# Patient Record
(demographics unavailable — no encounter records)

---

## 2024-12-17 NOTE — IMAGING
[de-identified] : The patient is a well appearing 51 year  old male of their stated age. Patient ambulates with a normal gait. Negative straight leg raise bilateral.   Effected Knee: LEFT  ROM:  3-140 degrees Lachman: Negative Pivot Shift: Negative Anterior Drawer: Negative Posterior Drawer / Sag: POSITIVE  Varus Stress 0 degrees: Stable Varus Stress 30 degrees: Stable Valgus Stress 0 degrees: Stable Valgus Stress 30 degrees: Stable Medial Jerry: POSITIVE  Lateral Jerry: POSITIVE  Patella Glide: 2+ Patella Apprehension: Negative Patella Grind: Negative Pes [place] Valgus: Negative Pes Cavus: Negative   Palpation: Medial Joint Line: TENDER  Lateral Joint Line: TENDER  Medial Collateral Ligament: Nontender Lateral Collateral Ligament/PLC: Nontender Distal Femur: Nontender Proximal Tibia: Nontender Tibial Tubercle: Nontender Medial Femoral Condyle: TENDER  Gerdy's Tubercle: Nontender Distal Pole Patella: Nontender Quadriceps Tendon: Nontender &  Intact Patella Tendon: Nontender &  Intact Medial Femoral Condyle: TENDER  Medial Distal Hamstring/PES: Nontender Lateral Distal Hamstring: Nontender & Stable Iliotibial Band: Nontender Medial Patellofemoral Ligament: Nontender Adductor: Nontender Proximal GSC-Plantaris: Nontender Calf: Supple & Nontender   Inspection: Deformity: No Erythema: No Ecchymosis: No Abrasions: No Effusion: No Prepatellar Bursitis: No Neurologic Exam: Sensation L4-S1: Grossly Intact Motor Exam: Quadriceps: 5 out of 5 Hamstrings: 5 out of 5 EHL: 5 out of 5 FHL: 5 out of 5 TA: 5 out of 5 GS: 5 out of 5 Circulatory/Pulses: Dorsalis Pedis: 2+ Posterior Tibialis: 2+ Additional Pertinent Findings: None   Contralateral Knee:                           ROM: 0-145 degrees Other Pertinent Findings: None   Assessment: The patient is a 51 year old male with left knee pain and radiographic and physical exam findings consistent with MMT, LMT, chronic PCL tear and osteoarthritis. The patient's condition is chronic. Documents/Results Reviewed Today: MRI left knee  Tests/Studies Independently Interpreted Today: MRI left knee reveals evidence of complex medial meniscus tearing with unstable flap, moderate medial compartment degenerative changes with subchondral edema, lateral meniscal tearing, chronic appearing posterior cruciate ligament tearing.  Pertinent findings include: 3-140, MJLT, LJLT, tender medial femoral condyle, tender medial tibial plateau, +medial jerry, +lateral jerry, +posterior drawer with sag sign.  Confounding medical conditions/concerns: Treated with outside physicians, 2c CSI without improvement last 2x weeks ago. Has been taking Mobic & Diclofenac.    Plan: We discussed treatment options both operative vs non-operative for the patients meniscal tearing, arthritis, and posterior knee instability. Although the patient may have relief of mechanical symptoms from a knee arthroscopy, he ultimately will have to consider a total knee arthroplasty. Upon clinical examination and report of symptoms, his pain appears to be more arthritic related. The patient decides to defer any arthroscopy and focus on a non-operative treatment of arthritic related pain. Patient will start physical therapy, HEP, and stretching. Advised patient to obtain Reparel sleeve. Continue use of outside prescribed Meloxicam 15mg or Diclofenac for pain management and inflammation - use as directed and take with food. If his knee pain is worsening, we will discuss proceeding with Visco supplementation injections before consider arthroscopy. Modify activity as discussed.  Tests Ordered: None  Prescription Medications Ordered: Continue PRN use of outside prescribed Meloxicam 15mg and Diclofenac  Braces/DME Ordered: Reparel knee sleeve  Activity/Work/Sports Status: As tolerated  Additional Instructions: None Follow-Up: 6 weeks   The patient's current medication management of their orthopedic diagnosis was reviewed today: (1) The patient will continue with the PRN use of their prescribed anti-inflammatory. (2) There is a moderate risk of morbidity with further treatment, especially from use of prescription strength medications and possible side effects of these medications which include upset stomach with oral medications, skin reactions to topical medications and cardiac/renal issues with long term use. (3) I recommended that the patient follow-up with their medical physician to discuss any significant specific potential issues with long term medication use such as interactions with current medications or with exacerbation of underlying medical comorbidities. (4) The benefits and risks associated with use of injectable, oral or topical, prescription and over the counter anti-inflammatory medications were discussed with the patient. The patient voiced understanding of the risks including but not limited to bleeding, stroke, kidney dysfunction, heart disease, and were referred to the black box warning label for further information.   I, Tressa Ulrich attest that this documentation has been prepared under the direction and in the presence of Provider Dr. Wilfrid M. Paci.    The documentation recorded by the scribe accurately reflects the services I, Dr. Wilfrid Dixon, personally performed and the decisions made by me.

## 2024-12-17 NOTE — IMAGING
[de-identified] : The patient is a well appearing 51 year  old male of their stated age. Patient ambulates with a normal gait. Negative straight leg raise bilateral.   Effected Knee: LEFT  ROM:  3-140 degrees Lachman: Negative Pivot Shift: Negative Anterior Drawer: Negative Posterior Drawer / Sag: POSITIVE  Varus Stress 0 degrees: Stable Varus Stress 30 degrees: Stable Valgus Stress 0 degrees: Stable Valgus Stress 30 degrees: Stable Medial Jerry: POSITIVE  Lateral Jerry: POSITIVE  Patella Glide: 2+ Patella Apprehension: Negative Patella Grind: Negative Pes [place] Valgus: Negative Pes Cavus: Negative   Palpation: Medial Joint Line: TENDER  Lateral Joint Line: TENDER  Medial Collateral Ligament: Nontender Lateral Collateral Ligament/PLC: Nontender Distal Femur: Nontender Proximal Tibia: Nontender Tibial Tubercle: Nontender Medial Femoral Condyle: TENDER  Gerdy's Tubercle: Nontender Distal Pole Patella: Nontender Quadriceps Tendon: Nontender &  Intact Patella Tendon: Nontender &  Intact Medial Femoral Condyle: TENDER  Medial Distal Hamstring/PES: Nontender Lateral Distal Hamstring: Nontender & Stable Iliotibial Band: Nontender Medial Patellofemoral Ligament: Nontender Adductor: Nontender Proximal GSC-Plantaris: Nontender Calf: Supple & Nontender   Inspection: Deformity: No Erythema: No Ecchymosis: No Abrasions: No Effusion: No Prepatellar Bursitis: No Neurologic Exam: Sensation L4-S1: Grossly Intact Motor Exam: Quadriceps: 5 out of 5 Hamstrings: 5 out of 5 EHL: 5 out of 5 FHL: 5 out of 5 TA: 5 out of 5 GS: 5 out of 5 Circulatory/Pulses: Dorsalis Pedis: 2+ Posterior Tibialis: 2+ Additional Pertinent Findings: None   Contralateral Knee:                           ROM: 0-145 degrees Other Pertinent Findings: None   Assessment: The patient is a 51 year old male with left knee pain and radiographic and physical exam findings consistent with MMT, LMT, chronic PCL tear and osteoarthritis. The patient's condition is chronic. Documents/Results Reviewed Today: MRI left knee  Tests/Studies Independently Interpreted Today: MRI left knee reveals evidence of complex medial meniscus tearing with unstable flap, moderate medial compartment degenerative changes with subchondral edema, lateral meniscal tearing, chronic appearing posterior cruciate ligament tearing.  Pertinent findings include: 3-140, MJLT, LJLT, tender medial femoral condyle, tender medial tibial plateau, +medial jerry, +lateral jerry, +posterior drawer with sag sign.  Confounding medical conditions/concerns: Treated with outside physicians, 2c CSI without improvement last 2x weeks ago. Has been taking Mobic & Diclofenac.    Plan: We discussed treatment options both operative vs non-operative for the patients meniscal tearing, arthritis, and posterior knee instability. Although the patient may have relief of mechanical symptoms from a knee arthroscopy, he ultimately will have to consider a total knee arthroplasty. Upon clinical examination and report of symptoms, his pain appears to be more arthritic related. The patient decides to defer any arthroscopy and focus on a non-operative treatment of arthritic related pain. Patient will start physical therapy, HEP, and stretching. Advised patient to obtain Reparel sleeve. Continue use of outside prescribed Meloxicam 15mg or Diclofenac for pain management and inflammation - use as directed and take with food. If his knee pain is worsening, we will discuss proceeding with Visco supplementation injections before consider arthroscopy. Modify activity as discussed.  Tests Ordered: None  Prescription Medications Ordered: Continue PRN use of outside prescribed Meloxicam 15mg and Diclofenac  Braces/DME Ordered: Reparel knee sleeve  Activity/Work/Sports Status: As tolerated  Additional Instructions: None Follow-Up: 6 weeks   The patient's current medication management of their orthopedic diagnosis was reviewed today: (1) The patient will continue with the PRN use of their prescribed anti-inflammatory. (2) There is a moderate risk of morbidity with further treatment, especially from use of prescription strength medications and possible side effects of these medications which include upset stomach with oral medications, skin reactions to topical medications and cardiac/renal issues with long term use. (3) I recommended that the patient follow-up with their medical physician to discuss any significant specific potential issues with long term medication use such as interactions with current medications or with exacerbation of underlying medical comorbidities. (4) The benefits and risks associated with use of injectable, oral or topical, prescription and over the counter anti-inflammatory medications were discussed with the patient. The patient voiced understanding of the risks including but not limited to bleeding, stroke, kidney dysfunction, heart disease, and were referred to the black box warning label for further information.   I, Tressa Ulrich attest that this documentation has been prepared under the direction and in the presence of Provider Dr. Wilfrid M. Paci.    The documentation recorded by the scribe accurately reflects the services I, Dr. Wilfrid Dixon, personally performed and the decisions made by me.

## 2024-12-17 NOTE — HISTORY OF PRESENT ILLNESS
[de-identified] : The patient is a 51 year  old R hand dominant male who presents today complaining of L knee pain.   Date of Injury/Onset: 6/1/24 Pain:    At Rest: 5/10  With Activity:  9/10  Mechanism of injury: insidious onset This is NOT a Work Related Injury being treated under Worker's Compensation. This is NOT an athletic injury occurring associated with an interscholastic or organized sports team. Quality of symptoms: sharp, aching anterior knee pain Improves with: rest, diclofenac  Worse with: prolonged walking/standing Prior treatment: Dr. Jamari Larios in July - ASP & CSI & Dr. Larios on 12/12 - CSI,- prescribed Mobic & Diclofenac  Prior Imaging: Xray, MRI at   Out of work/sport: currently working School/Sport/Position/Occupation:  - gym, soccer Additional Information: None

## 2024-12-17 NOTE — DATA REVIEWED
[MRI] : MRI [Left] : left [Knee] : knee [Report was reviewed and noted in the chart] : The report was reviewed and noted in the chart [I independently reviewed and interpreted images and report] : I independently reviewed and interpreted images and report [I reviewed the films/CD and additionally noted] : I reviewed the films/CD and additionally noted [FreeTextEntry1] : MRI left knee reveals evidence of complex medial meniscus tearing with unstable flap, moderate medial compartment degenerative changes with subchondral edema, lateral meniscal tearing, chronic appearing posterior cruciate ligament tearing.

## 2024-12-17 NOTE — HISTORY OF PRESENT ILLNESS
[de-identified] : The patient is a 51 year  old R hand dominant male who presents today complaining of L knee pain.   Date of Injury/Onset: 6/1/24 Pain:    At Rest: 5/10  With Activity:  9/10  Mechanism of injury: insidious onset This is NOT a Work Related Injury being treated under Worker's Compensation. This is NOT an athletic injury occurring associated with an interscholastic or organized sports team. Quality of symptoms: sharp, aching anterior knee pain Improves with: rest, diclofenac  Worse with: prolonged walking/standing Prior treatment: Dr. Jamari Larios in July - ASP & CSI & Dr. Larios on 12/12 - CSI,- prescribed Mobic & Diclofenac  Prior Imaging: Xray, MRI at   Out of work/sport: currently working School/Sport/Position/Occupation:  - gym, soccer Additional Information: None

## 2025-02-04 NOTE — HISTORY OF PRESENT ILLNESS
[de-identified] : The patient is a 51 year  old R hand dominant male who presents today complaining of L knee pain.   Date of Injury/Onset: 6/1/24 Pain:    At Rest: 7/10  With Activity:  7/10  Mechanism of injury: insidious onset This is NOT a Work Related Injury being treated under Worker's Compensation. This is NOT an athletic injury occurring associated with an interscholastic or organized sports team. Quality of symptoms: sharp, aching anterior knee pain Improves with: rest, diclofenac  Worse with: prolonged walking/standing, sit to stand  Treatment/Imaging/Studies Since Last Visit: [facility], HEP 	Reports Available For Review Today: none Out of work/sport: currently working School/Sport/Position/Occupation:  - gym, soccer Changes since last visit: Feeling a little better. In PT and doing HEP Additional Information: None

## 2025-02-04 NOTE — HISTORY OF PRESENT ILLNESS
[de-identified] : The patient is a 51 year  old R hand dominant male who presents today complaining of L knee pain.   Date of Injury/Onset: 6/1/24 Pain:    At Rest: 7/10  With Activity:  7/10  Mechanism of injury: insidious onset This is NOT a Work Related Injury being treated under Worker's Compensation. This is NOT an athletic injury occurring associated with an interscholastic or organized sports team. Quality of symptoms: sharp, aching anterior knee pain Improves with: rest, diclofenac  Worse with: prolonged walking/standing, sit to stand  Treatment/Imaging/Studies Since Last Visit: [facility] [place], HEP 	Reports Available For Review Today: none Out of work/sport: currently working School/Sport/Position/Occupation:  - gym, soccer Changes since last visit: Feeling a little better. In PT and doing HEP Additional Information: None

## 2025-02-04 NOTE — IMAGING
[de-identified] : The patient is a well appearing 51 year  old male of their stated age. Patient ambulates with a normal gait. Negative straight leg raise bilateral.   Effected Knee: LEFT  ROM:  3-140 degrees Lachman: Negative Pivot Shift: Negative Anterior Drawer: Negative Posterior Drawer / Sag: POSITIVE  Varus Stress 0 degrees: Stable Varus Stress 30 degrees: Stable Valgus Stress 0 degrees: Stable Valgus Stress 30 degrees: Stable Medial Jerry: POSITIVE  Lateral Jerry: POSITIVE  Patella Glide: 2+ Patella Apprehension: Negative Patella Grind: Negative Pes [place] Valgus: Negative Pes Cavus: Negative   Palpation: Medial Joint Line: TENDER  Lateral Joint Line: TENDER  Medial Collateral Ligament: Nontender Lateral Collateral Ligament/PLC: Nontender Distal Femur: Nontender Proximal Tibia: Nontender Tibial Tubercle: Nontender Medial Femoral Condyle: TENDER  Gerdy's Tubercle: Nontender Distal Pole Patella: Nontender Quadriceps Tendon: Nontender &  Intact Patella Tendon: Nontender &  Intact Medial Femoral Condyle: TENDER  Medial Distal Hamstring/PES: Nontender Lateral Distal Hamstring: Nontender & Stable Iliotibial Band: Nontender Medial Patellofemoral Ligament: Nontender Adductor: Nontender Proximal GSC-Plantaris: Nontender Calf: Supple & Nontender   Inspection: Deformity: No Erythema: No Ecchymosis: No Abrasions: No Effusion: No Prepatellar Bursitis: No Neurologic Exam: Sensation L4-S1: Grossly Intact Motor Exam: Quadriceps: 5 out of 5 Hamstrings: 5 out of 5 EHL: 5 out of 5 FHL: 5 out of 5 TA: 5 out of 5 GS: 5 out of 5 Circulatory/Pulses: Dorsalis Pedis: 2+ Posterior Tibialis: 2+ Additional Pertinent Findings: None   Contralateral Knee:                           ROM: 0-145 degrees Other Pertinent Findings: None   Assessment: The patient is a 51 year old male with left knee pain and radiographic and physical exam findings consistent with MMT, LMT, chronic PCL tear and osteoarthritis. The patient's condition is chronic. Documents/Results Reviewed Today: None Tests/Studies Independently Interpreted Today: None Pertinent findings include: 3-140, MJLT, LJLT, tender medial femoral condyle, tender medial tibial plateau, +medial jerry, +lateral jerry, +posterior drawer with sag sign.  Confounding medical conditions/concerns: Treated with outside physicians, 2c CSI without improvement last 2x weeks ago. Has been taking Mobic & Diclofenac.    Plan: Patient reports mild improvement but still discomfort with symptoms related to meniscal tearing. The patient still decides to defer any arthroscopy and focus on a non-operative treatment of arthritic related pain. Patient will continue physical therapy, HEP, and stretching. Advised patient to obtain Reparel sleeve. Continue use of outside prescribed Meloxicam 15mg or Diclofenac for pain management and inflammation - use as directed and take with food. Discussed Visco supplementation today, patient agrees we will submit for authorization today. Modify activity as discussed.  Tests Ordered: None  Prescription Medications Ordered: Continue PRN use of outside prescribed Meloxicam 15mg and Diclofenac  Braces/DME Ordered: Reparel knee sleeve  Activity/Work/Sports Status: As tolerated  Additional Instructions: None Follow-Up:  For Visco injection  The patient's current medication management of their orthopedic diagnosis was reviewed today: (1) The patient will continue with the PRN use of their prescribed anti-inflammatory. (2) There is a moderate risk of morbidity with further treatment, especially from use of prescription strength medications and possible side effects of these medications which include upset stomach with oral medications, skin reactions to topical medications and cardiac/renal issues with long term use. (3) I recommended that the patient follow-up with their medical physician to discuss any significant specific potential issues with long term medication use such as interactions with current medications or with exacerbation of underlying medical comorbidities. (4) The benefits and risks associated with use of injectable, oral or topical, prescription and over the counter anti-inflammatory medications were discussed with the patient. The patient voiced understanding of the risks including but not limited to bleeding, stroke, kidney dysfunction, heart disease, and were referred to the black box warning label for further information.   Kaylie HOWELL attest that this documentation has been prepared under the direction and in the presence of Provider Dr. Wilfrid Dixon.  The documentation recorded by the scribe accurately reflects the services IDr. Wilfrid, personally performed and the decisions made by me.

## 2025-02-04 NOTE — IMAGING
[de-identified] : The patient is a well appearing 51 year  old male of their stated age. Patient ambulates with a normal gait. Negative straight leg raise bilateral.   Effected Knee: LEFT  ROM:  3-140 degrees Lachman: Negative Pivot Shift: Negative Anterior Drawer: Negative Posterior Drawer / Sag: POSITIVE  Varus Stress 0 degrees: Stable Varus Stress 30 degrees: Stable Valgus Stress 0 degrees: Stable Valgus Stress 30 degrees: Stable Medial Jerry: POSITIVE  Lateral Jerry: POSITIVE  Patella Glide: 2+ Patella Apprehension: Negative Patella Grind: Negative Pes [place] Valgus: Negative Pes Cavus: Negative   Palpation: Medial Joint Line: TENDER  Lateral Joint Line: TENDER  Medial Collateral Ligament: Nontender Lateral Collateral Ligament/PLC: Nontender Distal Femur: Nontender Proximal Tibia: Nontender Tibial Tubercle: Nontender Medial Femoral Condyle: TENDER  Gerdy's Tubercle: Nontender Distal Pole Patella: Nontender Quadriceps Tendon: Nontender &  Intact Patella Tendon: Nontender &  Intact Medial Femoral Condyle: TENDER  Medial Distal Hamstring/PES: Nontender Lateral Distal Hamstring: Nontender & Stable Iliotibial Band: Nontender Medial Patellofemoral Ligament: Nontender Adductor: Nontender Proximal GSC-Plantaris: Nontender Calf: Supple & Nontender   Inspection: Deformity: No Erythema: No Ecchymosis: No Abrasions: No Effusion: No Prepatellar Bursitis: No Neurologic Exam: Sensation L4-S1: Grossly Intact Motor Exam: Quadriceps: 5 out of 5 Hamstrings: 5 out of 5 EHL: 5 out of 5 FHL: 5 out of 5 TA: 5 out of 5 GS: 5 out of 5 Circulatory/Pulses: Dorsalis Pedis: 2+ Posterior Tibialis: 2+ Additional Pertinent Findings: None   Contralateral Knee:                           ROM: 0-145 degrees Other Pertinent Findings: None   Assessment: The patient is a 51 year old male with left knee pain and radiographic and physical exam findings consistent with MMT, LMT, chronic PCL tear and osteoarthritis. The patient's condition is chronic. Documents/Results Reviewed Today: None Tests/Studies Independently Interpreted Today: None Pertinent findings include: 3-140, MJLT, LJLT, tender medial femoral condyle, tender medial tibial plateau, +medial jerry, +lateral jerry, +posterior drawer with sag sign.  Confounding medical conditions/concerns: Treated with outside physicians, 2c CSI without improvement last 2x weeks ago. Has been taking Mobic & Diclofenac.    Plan: Patient reports mild improvement but still discomfort with symptoms related to meniscal tearing. The patient still decides to defer any arthroscopy and focus on a non-operative treatment of arthritic related pain. Patient will continue physical therapy, HEP, and stretching. Advised patient to obtain Reparel sleeve. Continue use of outside prescribed Meloxicam 15mg or Diclofenac for pain management and inflammation - use as directed and take with food. Discussed Visco supplementation today, patient agrees we will submit for authorization today. Modify activity as discussed.  Tests Ordered: None  Prescription Medications Ordered: Continue PRN use of outside prescribed Meloxicam 15mg and Diclofenac  Braces/DME Ordered: Reparel knee sleeve  Activity/Work/Sports Status: As tolerated  Additional Instructions: None Follow-Up:  For Visco injection  The patient's current medication management of their orthopedic diagnosis was reviewed today: (1) The patient will continue with the PRN use of their prescribed anti-inflammatory. (2) There is a moderate risk of morbidity with further treatment, especially from use of prescription strength medications and possible side effects of these medications which include upset stomach with oral medications, skin reactions to topical medications and cardiac/renal issues with long term use. (3) I recommended that the patient follow-up with their medical physician to discuss any significant specific potential issues with long term medication use such as interactions with current medications or with exacerbation of underlying medical comorbidities. (4) The benefits and risks associated with use of injectable, oral or topical, prescription and over the counter anti-inflammatory medications were discussed with the patient. The patient voiced understanding of the risks including but not limited to bleeding, stroke, kidney dysfunction, heart disease, and were referred to the black box warning label for further information.   Kaylie HOWELL attest that this documentation has been prepared under the direction and in the presence of Provider Dr. Wilfrid Dixon.  The documentation recorded by the scribe accurately reflects the services IDr. Wilfrid, personally performed and the decisions made by me.

## 2025-03-04 NOTE — HISTORY OF PRESENT ILLNESS
[de-identified] : The patient is a 51 year  old R hand dominant male who presents today complaining of L knee pain.   Date of Injury/Onset: 6/1/24 Pain:    At Rest: 7/10  With Activity:  7/10  Mechanism of injury: insidious onset This is NOT a Work Related Injury being treated under Worker's Compensation. This is NOT an athletic injury occurring associated with an interscholastic or organized sports team. Quality of symptoms: sharp, aching anterior knee pain Improves with: rest, diclofenac  Worse with: prolonged walking/standing, sit to stand  Treatment/Imaging/Studies Since Last Visit: [facility] d/c 2/2025, HEP 	Reports Available For Review Today: none Out of work/sport: currently working School/Sport/Position/Occupation:  - gym, soccer Changes since last visit: Overall feeling the same, c/o persistent pain. He would like to move forward w/ viscosupplementation treatment to the left knee.  Additional Information: None

## 2025-03-04 NOTE — IMAGING
[de-identified] : The patient is a well appearing 51 year  old male of their stated age. Patient ambulates with a normal gait. Negative straight leg raise bilateral.   Effected Knee: LEFT  ROM:  3-140 degrees Lachman: Negative Pivot Shift: Negative Anterior Drawer: Negative Posterior Drawer / Sag: POSITIVE  Varus Stress 0 degrees: Stable Varus Stress 30 degrees: Stable Valgus Stress 0 degrees: Stable Valgus Stress 30 degrees: Stable Medial Jerry: POSITIVE  Lateral Jerry: POSITIVE  Patella Glide: 2+ Patella Apprehension: Negative Patella Grind: Negative Pes [place] Valgus: Negative Pes Cavus: Negative   Palpation: Medial Joint Line: TENDER  Lateral Joint Line: TENDER  Medial Collateral Ligament: Nontender Lateral Collateral Ligament/PLC: Nontender Distal Femur: Nontender Proximal Tibia: Nontender Tibial Tubercle: Nontender Medial Femoral Condyle: TENDER  Gerdy's Tubercle: Nontender Distal Pole Patella: Nontender Quadriceps Tendon: Nontender &  Intact Patella Tendon: Nontender &  Intact Medial Femoral Condyle: TENDER  Medial Distal Hamstring/PES: Nontender Lateral Distal Hamstring: Nontender & Stable Iliotibial Band: Nontender Medial Patellofemoral Ligament: Nontender Adductor: Nontender Proximal GSC-Plantaris: Nontender Calf: Supple & Nontender   Inspection: Deformity: No Erythema: No Ecchymosis: No Abrasions: No Effusion: No Prepatellar Bursitis: No Neurologic Exam: Sensation L4-S1: Grossly Intact Motor Exam: Quadriceps: 5 out of 5 Hamstrings: 5 out of 5 EHL: 5 out of 5 FHL: 5 out of 5 TA: 5 out of 5 GS: 5 out of 5 Circulatory/Pulses: Dorsalis Pedis: 2+ Posterior Tibialis: 2+ Additional Pertinent Findings: None   Contralateral Knee:                           ROM: 0-145 degrees Other Pertinent Findings: None   Assessment: The patient is a 51 year old male with left knee pain and radiographic and physical exam findings consistent with MMT, LMT, chronic PCL tear and osteoarthritis. The patient's condition is chronic. Documents/Results Reviewed Today: None Tests/Studies Independently Interpreted Today: None Pertinent findings include: 3-140, MJLT, LJLT, tender medial femoral condyle, tender medial tibial plateau, +medial jerry, +lateral jerry, +posterior drawer with sag sign.  Confounding medical conditions/concerns: Treated with outside physicians, 2c CSI without improvement last 2x weeks ago. Has been taking Mobic & Diclofenac.    Plan: Patient reports mild improvement but still discomfort with symptoms related to meniscal tearing. The patient still decides to defer any arthroscopy and focus on a non-operative treatment of arthritic related pain. Patient will continue physical therapy, HEP, and stretching. Advised patient to obtain Reparel sleeve. Continue use of outside prescribed Meloxicam 15mg or Diclofenac for pain management and inflammation - use as directed and take with food. Discussed treatment options in the form of injections to aid in pain, inflammation, and discomfort. Patient elected to receive Left knee Monovisc. Advised patient to rest and ice the area as tolerated. Modify activity as discussed.  Tests Ordered: None  Prescription Medications Ordered: Continue PRN use of outside prescribed Meloxicam 15mg and Diclofenac  Braces/DME Ordered: Reparel knee sleeve  Activity/Work/Sports Status: As tolerated  Additional Instructions: None Follow-Up:  6 months and 1 day   Procedure Note: Musculoskeletal Injection  Diagnosis: Left knee OA Procedure: Left knee, superolateral, Monovisc injection    Indication:  The patient has had persistent pain despite conservative treatment.  Risks, benefits and alternatives to procedure were discussed; all questions were answered to the patient's apparent satisfaction and informed consent obtained.  Consent form was signed and dated today.  The patient denied prior problems with local anesthetics, injectable cortisones, chicken allergy, coagulopathy and no relevant drug or preservative allergies or sensitivities.   The area of injection was prepared in a sterile fashion.  Prior to injection a 'Time Out' was conducted in accordance with [facility] [employer] policy and the site and nature of procedure verified with the patient.     Procedure: The injection and aspiration was carried out utilizing sterile technique from a superolateral arthroscopic portal position with needle placement under ultrasound guidance to improve accuracy and minimize risk to the patient and:  (X) Diagnostic ultrasound in the long and short axis revealed Left knee osteoarthritis    16cc of clear synovial fluid was aspirated. The specimen: (X) appeared benign and was discarded ( ) was sent for Culture / Cell Count / Crystal analysis / [_].   Injection into the target area with care taken to aspirate frequently to minimize the risk of intravascular injection was performed with:   ( ) 1cc of Depomedrol (80mg/ml) ( ) 1cc of Dexamethasone (10mg/ml) ( ) 1cc of Toradol (30mg/ml) ( ) 9cc of 0.5% Bupivacaine (X) 5 cc of 1% Lidocaine ( ) 5cc of 32mg Zilretta, prepared and diluted per  instructions ( ) 2 cc of Hylan G-F 20 (Synvisc) 16mg/2ml ( ) 6 cc of Hylan G-F 20 (SynvisoOne) 16mg/2ml ( ) 2cc of Euflexxa ( ) 2cc of Orthovisc (X) 4 cc of Monovisc ( ) 2cc of GelOne ( ) 3cc of Durolane (20mg/ml)   Patient tolerated the procedure well and direct pressure was applied for hemostasis. The patient was reminded of potential post-injection risks including, but not limited to, delayed hypersensitivity reactions and/or infection.  The patient verified that they had the office and the Emergency Room's contact information if any problems should arise.  After several minutes, the patient informed me that they felt fine and was released from the office.  The patient's current medication management of their orthopedic diagnosis was reviewed today: (1) The patient will continue with the PRN use of their prescribed anti-inflammatory. (2) There is a moderate risk of morbidity with further treatment, especially from use of prescription strength medications and possible side effects of these medications which include upset stomach with oral medications, skin reactions to topical medications and cardiac/renal issues with long term use. (3) I recommended that the patient follow-up with their medical physician to discuss any significant specific potential issues with long term medication use such as interactions with current medications or with exacerbation of underlying medical comorbidities. (4) The benefits and risks associated with use of injectable, oral or topical, prescription and over the counter anti-inflammatory medications were discussed with the patient. The patient voiced understanding of the risks including but not limited to bleeding, stroke, kidney dysfunction, heart disease, and were referred to the black box warning label for further information.   Kaylie HOWELL attest that this documentation has been prepared under the direction and in the presence of Mara Harris PA-C.  The documentation recorded by the scribe accurately reflects the service Mara HOWELL PA-C, personally performed and the decisions made by me.